# Patient Record
Sex: FEMALE | URBAN - METROPOLITAN AREA
[De-identification: names, ages, dates, MRNs, and addresses within clinical notes are randomized per-mention and may not be internally consistent; named-entity substitution may affect disease eponyms.]

---

## 2022-09-16 ENCOUNTER — OFFICE VISIT (OUTPATIENT)
Dept: URBAN - METROPOLITAN AREA CLINIC 88 | Facility: CLINIC | Age: 42
End: 2022-09-16
Payer: MEDICARE

## 2022-09-16 DIAGNOSIS — E11.9 TYPE 2 DIABETES MELLITUS W/O COMPLICATION: Primary | ICD-10-CM

## 2022-09-16 PROCEDURE — 99203 OFFICE O/P NEW LOW 30 MIN: CPT | Performed by: OPTOMETRIST

## 2022-09-16 ASSESSMENT — INTRAOCULAR PRESSURE
OS: 16
OD: 16

## 2022-09-16 NOTE — IMPRESSION/PLAN
Impression: Type 2 diabetes mellitus w/o complication: S78.1. Plan: Reviewed with patient that no retinal vascular changes are occurring from diabetes. Patient to continue care with current medical provider for diabetes management. Importance of retinal exams reviewed. Will continue to observe with dilated examination in 12 months.